# Patient Record
Sex: FEMALE | Race: WHITE | Employment: FULL TIME | URBAN - METROPOLITAN AREA
[De-identification: names, ages, dates, MRNs, and addresses within clinical notes are randomized per-mention and may not be internally consistent; named-entity substitution may affect disease eponyms.]

---

## 2021-07-23 ENCOUNTER — TELEPHONE (OUTPATIENT)
Dept: ORTHOPEDICS CLINIC | Facility: CLINIC | Age: 31
End: 2021-07-23

## 2021-07-23 DIAGNOSIS — M25.512 LEFT SHOULDER PAIN, UNSPECIFIED CHRONICITY: Primary | ICD-10-CM

## 2021-07-23 NOTE — TELEPHONE ENCOUNTER
Attempted to call Franklyn Wolfe regarding questions regarding imaging for her upcoming appt. Reached voicemail, left voicemail and provided a detail message with my call back contact information.

## 2021-07-23 NOTE — TELEPHONE ENCOUNTER
Imaging was completedfor this patient for a LT Shoulder  @ Dr. Norah Ballard's office, but that office does not use Epic per [facility] @ [phone] .     Please enter the appropriate RX and let the patient know to come in before their appointment to complete t

## 2021-07-26 NOTE — TELEPHONE ENCOUNTER
Spoke with patient and notified her that if the xrays of her left shoulder are unable to be received by the appointment date (Dr. Christen Baron office is not on [facility]), new xrays will be ordered and she can have those completed before her scheduled appt.  Pt v

## 2021-07-26 NOTE — TELEPHONE ENCOUNTER
Attempted to call [facility] Wolfe regarding upcoming appt. Reached voicemail, left voicemail and provided a detail message with my call back contact information.

## 2021-08-06 ENCOUNTER — OFFICE VISIT (OUTPATIENT)
Dept: ORTHOPEDICS CLINIC | Facility: CLINIC | Age: 31
End: 2021-08-06
Payer: COMMERCIAL

## 2021-08-06 ENCOUNTER — HOSPITAL ENCOUNTER (OUTPATIENT)
Dept: GENERAL RADIOLOGY | Age: 31
Discharge: HOME | End: 2021-08-06
Attending: ORTHOPAEDIC SURGERY
Payer: COMMERCIAL

## 2021-08-06 DIAGNOSIS — M25.512 LEFT SHOULDER PAIN, UNSPECIFIED CHRONICITY: ICD-10-CM

## 2021-08-06 DIAGNOSIS — M25.312 INSTABILITY OF LEFT SHOULDER JOINT: Primary | ICD-10-CM

## 2021-08-06 PROCEDURE — 99204 OFFICE O/P NEW MOD 45 MIN: CPT | Performed by: ORTHOPAEDIC SURGERY

## 2021-08-06 NOTE — H&P
[facility] - ORTHOPEDICS  [facility] [id number]  [phone]     NEW PATIENT VISIT - HISTORY AND PHYSICAL EXAMINATION     Name: Jelly Adjutant   MRN: [id number]  Date: 8/6/2021     CC: Left shoulder filed for this visit. There is no height or weight on file to calculate BMI. Physical Exam  Constitutional:       Appearance: Normal appearance. HENT:      Head: Normocephalic and atraumatic.    Eyes:      Extraocular Movements: Extraocular movements radial and axillary nerve  Circulation:   Normal, 2+ radial pulse    The contralateral upper extremity is without limitation in range of motion or strength, no positive provocative maneuvers.      Radiographic Examination/Diagnostics:    No radiographs were [facility]. [email or web address]  t: [phone]  o: [phone]  f: [phone]        This note was dictated using Dragon software.   While it was briefly proofread prior to completion, some grammatical, spelling, and word choice errors due to [facility]